# Patient Record
Sex: FEMALE | Race: WHITE | NOT HISPANIC OR LATINO | URBAN - METROPOLITAN AREA
[De-identification: names, ages, dates, MRNs, and addresses within clinical notes are randomized per-mention and may not be internally consistent; named-entity substitution may affect disease eponyms.]

---

## 2017-03-18 ENCOUNTER — INPATIENT (INPATIENT)
Facility: HOSPITAL | Age: 77
LOS: 5 days | Discharge: HOME HEALTH | DRG: 199 | End: 2017-03-24
Attending: INTERNAL MEDICINE | Admitting: INTERNAL MEDICINE
Payer: COMMERCIAL

## 2017-03-18 VITALS
HEIGHT: 64 IN | DIASTOLIC BLOOD PRESSURE: 78 MMHG | SYSTOLIC BLOOD PRESSURE: 102 MMHG | WEIGHT: 154.98 LBS | OXYGEN SATURATION: 94 % | RESPIRATION RATE: 16 BRPM | HEART RATE: 72 BPM

## 2017-03-18 DIAGNOSIS — J93.9 PNEUMOTHORAX, UNSPECIFIED: ICD-10-CM

## 2017-03-18 DIAGNOSIS — I10 ESSENTIAL (PRIMARY) HYPERTENSION: ICD-10-CM

## 2017-03-18 DIAGNOSIS — I63.9 CEREBRAL INFARCTION, UNSPECIFIED: ICD-10-CM

## 2017-03-18 DIAGNOSIS — E03.9 HYPOTHYROIDISM, UNSPECIFIED: ICD-10-CM

## 2017-03-18 DIAGNOSIS — J18.9 PNEUMONIA, UNSPECIFIED ORGANISM: ICD-10-CM

## 2017-03-18 DIAGNOSIS — Z29.9 ENCOUNTER FOR PROPHYLACTIC MEASURES, UNSPECIFIED: ICD-10-CM

## 2017-03-18 LAB
ALBUMIN SERPL ELPH-MCNC: 3.1 G/DL — LOW (ref 3.5–5)
ALP SERPL-CCNC: 71 U/L — SIGNIFICANT CHANGE UP (ref 40–120)
ALT FLD-CCNC: 20 U/L DA — SIGNIFICANT CHANGE UP (ref 10–60)
ANION GAP SERPL CALC-SCNC: 9 MMOL/L — SIGNIFICANT CHANGE UP (ref 5–17)
APPEARANCE UR: CLEAR — SIGNIFICANT CHANGE UP
APTT BLD: 27.3 SEC — LOW (ref 27.5–37.4)
AST SERPL-CCNC: 25 U/L — SIGNIFICANT CHANGE UP (ref 10–40)
BASE EXCESS BLDA CALC-SCNC: -3.1 MMOL/L — LOW (ref -2–2)
BASOPHILS # BLD AUTO: 0 K/UL — SIGNIFICANT CHANGE UP (ref 0–0.2)
BASOPHILS NFR BLD AUTO: 0.5 % — SIGNIFICANT CHANGE UP (ref 0–2)
BILIRUB SERPL-MCNC: 0.4 MG/DL — SIGNIFICANT CHANGE UP (ref 0.2–1.2)
BILIRUB UR-MCNC: NEGATIVE — SIGNIFICANT CHANGE UP
BLOOD GAS COMMENTS ARTERIAL: SIGNIFICANT CHANGE UP
BUN SERPL-MCNC: 61 MG/DL — HIGH (ref 7–18)
CALCIUM SERPL-MCNC: 8.5 MG/DL — SIGNIFICANT CHANGE UP (ref 8.4–10.5)
CHLORIDE SERPL-SCNC: 102 MMOL/L — SIGNIFICANT CHANGE UP (ref 96–108)
CO2 SERPL-SCNC: 24 MMOL/L — SIGNIFICANT CHANGE UP (ref 22–31)
COLOR SPEC: YELLOW — SIGNIFICANT CHANGE UP
CREAT SERPL-MCNC: 1.19 MG/DL — SIGNIFICANT CHANGE UP (ref 0.5–1.3)
DIFF PNL FLD: ABNORMAL
EOSINOPHIL # BLD AUTO: 0 K/UL — SIGNIFICANT CHANGE UP (ref 0–0.5)
EOSINOPHIL NFR BLD AUTO: 0.1 % — SIGNIFICANT CHANGE UP (ref 0–6)
FLUAV H1 2009 PAND RNA SPEC QL NAA+PROBE: DETECTED
GLUCOSE SERPL-MCNC: 119 MG/DL — HIGH (ref 70–99)
GLUCOSE UR QL: NEGATIVE — SIGNIFICANT CHANGE UP
HCO3 BLDA-SCNC: 20 MMOL/L — LOW (ref 23–27)
HCT VFR BLD CALC: 39 % — SIGNIFICANT CHANGE UP (ref 34.5–45)
HGB BLD-MCNC: 12.2 G/DL — SIGNIFICANT CHANGE UP (ref 11.5–15.5)
HOROWITZ INDEX BLDA+IHG-RTO: 28 — SIGNIFICANT CHANGE UP
INR BLD: 1.01 RATIO — SIGNIFICANT CHANGE UP (ref 0.88–1.16)
KETONES UR-MCNC: ABNORMAL
LACTATE SERPL-SCNC: 1.1 MMOL/L — SIGNIFICANT CHANGE UP (ref 0.7–2)
LEUKOCYTE ESTERASE UR-ACNC: ABNORMAL
LYMPHOCYTES # BLD AUTO: 0.3 K/UL — LOW (ref 1–3.3)
LYMPHOCYTES # BLD AUTO: 7.8 % — LOW (ref 13–44)
MCHC RBC-ENTMCNC: 26.2 PG — LOW (ref 27–34)
MCHC RBC-ENTMCNC: 31.3 GM/DL — LOW (ref 32–36)
MCV RBC AUTO: 83.9 FL — SIGNIFICANT CHANGE UP (ref 80–100)
MONOCYTES # BLD AUTO: 0.3 K/UL — SIGNIFICANT CHANGE UP (ref 0–0.9)
MONOCYTES NFR BLD AUTO: 7.6 % — SIGNIFICANT CHANGE UP (ref 2–14)
NEUTROPHILS # BLD AUTO: 3.7 K/UL — SIGNIFICANT CHANGE UP (ref 1.8–7.4)
NEUTROPHILS NFR BLD AUTO: 84 % — HIGH (ref 43–77)
NITRITE UR-MCNC: NEGATIVE — SIGNIFICANT CHANGE UP
PCO2 BLDA: 34 MMHG — SIGNIFICANT CHANGE UP (ref 32–46)
PH BLDA: 7.4 — SIGNIFICANT CHANGE UP (ref 7.35–7.45)
PH UR: 5 — SIGNIFICANT CHANGE UP (ref 4.8–8)
PLATELET # BLD AUTO: 183 K/UL — SIGNIFICANT CHANGE UP (ref 150–400)
PO2 BLDA: 59 MMHG — LOW (ref 74–108)
POTASSIUM SERPL-MCNC: 4.5 MMOL/L — SIGNIFICANT CHANGE UP (ref 3.5–5.3)
POTASSIUM SERPL-SCNC: 4.5 MMOL/L — SIGNIFICANT CHANGE UP (ref 3.5–5.3)
PROT SERPL-MCNC: 8.7 G/DL — HIGH (ref 6–8.3)
PROT UR-MCNC: 30 MG/DL
PROTHROM AB SERPL-ACNC: 11.3 SEC — SIGNIFICANT CHANGE UP (ref 10–13.1)
RAPID RVP RESULT: DETECTED
RBC # BLD: 4.65 M/UL — SIGNIFICANT CHANGE UP (ref 3.8–5.2)
RBC # FLD: 13.4 % — SIGNIFICANT CHANGE UP (ref 10.3–14.5)
SAO2 % BLDA: 88 % — LOW (ref 92–96)
SODIUM SERPL-SCNC: 135 MMOL/L — SIGNIFICANT CHANGE UP (ref 135–145)
SP GR SPEC: 1.01 — SIGNIFICANT CHANGE UP (ref 1.01–1.02)
UROBILINOGEN FLD QL: NEGATIVE — SIGNIFICANT CHANGE UP
WBC # BLD: 4.4 K/UL — SIGNIFICANT CHANGE UP (ref 3.8–10.5)
WBC # FLD AUTO: 4.4 K/UL — SIGNIFICANT CHANGE UP (ref 3.8–10.5)

## 2017-03-18 PROCEDURE — 99285 EMERGENCY DEPT VISIT HI MDM: CPT | Mod: 25

## 2017-03-18 PROCEDURE — 71010: CPT | Mod: 26

## 2017-03-18 RX ORDER — IPRATROPIUM/ALBUTEROL SULFATE 18-103MCG
3 AEROSOL WITH ADAPTER (GRAM) INHALATION ONCE
Qty: 0 | Refills: 0 | Status: COMPLETED | OUTPATIENT
Start: 2017-03-18 | End: 2017-03-18

## 2017-03-18 RX ORDER — IPRATROPIUM/ALBUTEROL SULFATE 18-103MCG
3 AEROSOL WITH ADAPTER (GRAM) INHALATION EVERY 6 HOURS
Qty: 0 | Refills: 0 | Status: DISCONTINUED | OUTPATIENT
Start: 2017-03-18 | End: 2017-03-24

## 2017-03-18 RX ORDER — ATORVASTATIN CALCIUM 80 MG/1
20 TABLET, FILM COATED ORAL AT BEDTIME
Qty: 0 | Refills: 0 | Status: DISCONTINUED | OUTPATIENT
Start: 2017-03-18 | End: 2017-03-18

## 2017-03-18 RX ORDER — CEFTRIAXONE 500 MG/1
1 INJECTION, POWDER, FOR SOLUTION INTRAMUSCULAR; INTRAVENOUS EVERY 24 HOURS
Qty: 0 | Refills: 0 | Status: DISCONTINUED | OUTPATIENT
Start: 2017-03-19 | End: 2017-03-24

## 2017-03-18 RX ORDER — LISINOPRIL 2.5 MG/1
5 TABLET ORAL DAILY
Qty: 0 | Refills: 0 | Status: DISCONTINUED | OUTPATIENT
Start: 2017-03-18 | End: 2017-03-24

## 2017-03-18 RX ORDER — AZITHROMYCIN 500 MG/1
500 TABLET, FILM COATED ORAL EVERY 24 HOURS
Qty: 0 | Refills: 0 | Status: DISCONTINUED | OUTPATIENT
Start: 2017-03-19 | End: 2017-03-24

## 2017-03-18 RX ORDER — CLOPIDOGREL BISULFATE 75 MG/1
75 TABLET, FILM COATED ORAL DAILY
Qty: 0 | Refills: 0 | Status: DISCONTINUED | OUTPATIENT
Start: 2017-03-18 | End: 2017-03-24

## 2017-03-18 RX ORDER — AMLODIPINE BESYLATE 2.5 MG/1
5 TABLET ORAL DAILY
Qty: 0 | Refills: 0 | Status: DISCONTINUED | OUTPATIENT
Start: 2017-03-18 | End: 2017-03-24

## 2017-03-18 RX ORDER — LEVOTHYROXINE SODIUM 125 MCG
75 TABLET ORAL DAILY
Qty: 0 | Refills: 0 | Status: DISCONTINUED | OUTPATIENT
Start: 2017-03-18 | End: 2017-03-24

## 2017-03-18 RX ORDER — MORPHINE SULFATE 50 MG/1
2 CAPSULE, EXTENDED RELEASE ORAL EVERY 4 HOURS
Qty: 0 | Refills: 0 | Status: DISCONTINUED | OUTPATIENT
Start: 2017-03-18 | End: 2017-03-24

## 2017-03-18 RX ORDER — ACETAMINOPHEN 500 MG
650 TABLET ORAL ONCE
Qty: 0 | Refills: 0 | Status: COMPLETED | OUTPATIENT
Start: 2017-03-18 | End: 2017-03-18

## 2017-03-18 RX ORDER — HEPARIN SODIUM 5000 [USP'U]/ML
5000 INJECTION INTRAVENOUS; SUBCUTANEOUS EVERY 8 HOURS
Qty: 0 | Refills: 0 | Status: DISCONTINUED | OUTPATIENT
Start: 2017-03-18 | End: 2017-03-24

## 2017-03-18 RX ORDER — AZITHROMYCIN 500 MG/1
500 TABLET, FILM COATED ORAL ONCE
Qty: 0 | Refills: 0 | Status: COMPLETED | OUTPATIENT
Start: 2017-03-18 | End: 2017-03-18

## 2017-03-18 RX ORDER — ACETAMINOPHEN 500 MG
650 TABLET ORAL EVERY 6 HOURS
Qty: 0 | Refills: 0 | Status: DISCONTINUED | OUTPATIENT
Start: 2017-03-18 | End: 2017-03-24

## 2017-03-18 RX ORDER — CEFTRIAXONE 500 MG/1
1 INJECTION, POWDER, FOR SOLUTION INTRAMUSCULAR; INTRAVENOUS ONCE
Qty: 0 | Refills: 0 | Status: COMPLETED | OUTPATIENT
Start: 2017-03-18 | End: 2017-03-18

## 2017-03-18 RX ADMIN — Medication 3 MILLILITER(S): at 12:23

## 2017-03-18 RX ADMIN — Medication 3 MILLILITER(S): at 12:33

## 2017-03-18 RX ADMIN — Medication 3 MILLILITER(S): at 12:03

## 2017-03-18 RX ADMIN — HEPARIN SODIUM 5000 UNIT(S): 5000 INJECTION INTRAVENOUS; SUBCUTANEOUS at 23:07

## 2017-03-18 RX ADMIN — Medication 650 MILLIGRAM(S): at 12:03

## 2017-03-18 RX ADMIN — AZITHROMYCIN 250 MILLIGRAM(S): 500 TABLET, FILM COATED ORAL at 14:20

## 2017-03-18 RX ADMIN — CEFTRIAXONE 100 GRAM(S): 500 INJECTION, POWDER, FOR SOLUTION INTRAMUSCULAR; INTRAVENOUS at 14:20

## 2017-03-18 NOTE — ED PROVIDER NOTE - NS ED MD SCRIBE ATTENDING SCRIBE SECTIONS
VITAL SIGNS( Pullset)/HIV/REVIEW OF SYSTEMS/PROGRESS NOTE/PHYSICAL EXAM/DISPOSITION/HISTORY OF PRESENT ILLNESS

## 2017-03-18 NOTE — ED PROVIDER NOTE - PROGRESS NOTE DETAILS
CXR showed large pneumothorax. Managed by surgery team with pigtail. Questionable pneumonia, will cover with Zithro and Ceftriaxone. Vitals stable. Patient feels better. Will admit to Dr Mcnulty. The scribe's documentation has been prepared under my direction and personally reviewed by me in its entirety. I confirm that the note above actually reflects all work, treatment, procedures, and medical decision-making performed by me - MINA Arellano

## 2017-03-18 NOTE — H&P ADULT. - ASSESSMENT
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    In ED, patient was tachypneic, tachycardic, with cxr s/o RT tension pneumothorax and ABG s/o hypoxic respiratory failure, 7.4/34/59/20/88%with 2 L NC. Surgery was consulted who placed RT sided chest tube.

## 2017-03-18 NOTE — H&P ADULT. - PROBLEM SELECTOR PLAN 1
Patient presented with fever, chills, night sweats, and worsening SOB.  On admission patient was tachycardic and tachypneic, with Patient presented with fever, chills, night sweats, and worsening SOB.  On admission patient was tachycardic and tachypneic, with spo2 94% RA.  patient had CXR s/o RT tension pneumothorax and ABG s/o hypoxic respiratory failure, 7.4/34/59/20/88%with 2 L NC.  Patient had Rt chest tube placed by surgery  s/p chest tube, repeat cxr was done s/o Weeks branch of lung following chest tube placement tiny right apical pneumothorax persists.  O/E- air entry decreased on Rt side with occasional crackles bilaterally  Rt side chest tube in place.  Will f/u with surgery for further recommendations.  Will repeat CXR daily to look for status of pneumothorax.  pain management with tylenol, percocet and morphine.  Oxygen inhalation prn  Will give Duoneb prn for sob or wheeze.

## 2017-03-18 NOTE — H&P ADULT. - ATTENDING COMMENTS
77 F (hx CVA 2013 - with residual partial L eye vision loss, hypothyroidism, HTN, ex-smoker 2ppd x 20 years, quit 20 years ago) present to ED for worsen SOB for past 2-3 days. States + cough, fever, night sweats for past 1-2 weeks. Notes visited  at nursing home 2 weeks ago. No CP, dizziness, palpitations, abdominal pain, N/V/D, dysuria, leg swelling. Cough at times severe, without SOB until last 2 days when felt progressive difficulty breathing.     In ED CXR with large RIGHT tension pneumothorax, right sided chest tube placed by surgery.     PE: vitals within normal limits on supplemental oxygen at time of admission  GEN: NAD, thin   HEENT: NCAT   CV: RRR   PULM: + R chest tube in place, + R lower lobe egophony and mild rales  ABD: soft, NTND   EXT: no edema    Admit for pneumothorax, CAP  - treat CAP (symptoms + RLL infiltrate on CXR) with IV Ceftriaxone, IV azithromycin, supplemental oxygen prn, check RVP  - patient without wheeze at this time, may start duonebs prn wheeze, SOB  - R chest tube placed, follow surgery reccs regarding follow up. Daily CXR, pain control  - resume home levothyroxine  - resume home BP medications, hold for SBP < 110  - continue plavix only for hx CVA. ASA and statin stopped by primary outpatient providers  - DVT ppx - HSQ  - FEN - DASH diet

## 2017-03-18 NOTE — H&P ADULT. - NEGATIVE MUSCULOSKELETAL SYMPTOMS
no arthralgia/no joint swelling/no arthritis/no muscle weakness/no myalgia/no muscle cramps/no neck pain/no stiffness

## 2017-03-18 NOTE — H&P ADULT. - PROBLEM SELECTOR PLAN 2
Patient had fever, night sweats and chills with cough along with RT basilar atelectasis vs infiltrate on CXR.  s/p 1 dose of rocephin and azithromycin in ED, will continue IV rocephin 1 gm OD and azithromycin 500mg iv OD.  f/u blood cultures.  Oxygen inhalation prn  Will give Duoneb prn for sob or wheeze. Patient had fever, night sweats and chills with cough along with RT basilar atelectasis vs infiltrate on CXR.  s/p 1 dose of rocephin and azithromycin in ED, will continue IV rocephin 1 gm OD and azithromycin 500mg iv OD.  f/u blood cultures.  Oxygen inhalation prn  Will give Duoneb prn for sob or wheeze.  Viral panel positive for influenza, will give tamiflu 75 mg BID for 5 days.  Pulmonology- Dr. Avila.

## 2017-03-18 NOTE — H&P ADULT. - NEGATIVE NEUROLOGICAL SYMPTOMS
no paresthesias/no focal seizures/no vertigo/no weakness/no transient paralysis/no syncope/no tremors/no generalized seizures

## 2017-03-18 NOTE — ED ADULT NURSE NOTE - ED STAT RN HANDOFF DETAILS 3
received   pt.in  bed  from ED at 2100 pt.is alert and  oriented x3. d/c  tele.underwater seal chest  tube intact to rt.chest wall.not   in  distress

## 2017-03-18 NOTE — H&P ADULT. - HISTORY OF PRESENT ILLNESS
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 Patient c/o intermittent fever, never recorded temperature, associated with chills and night sweats, associated with cough, unable to bring out the sputum. Associated with sob, which is worsening for last 2-3 days, initially on exertion and later progressed to at rest. Patient visited  2 weeks ago  at nursing home at [place] and later on she developed symptoms of runny nose and cough, of which runny nose resolved on its own. Patient also reports vomiting and diarrhea for a day, 3 days ago which subsided on its own. patient denies CP, dizziness, palpitations, abdominal pain, N/V/D, dysuria, leg swelling, urinary or bowel disturbances.  PSH- LT nipple removal for Breast cyst, tonsillectomy  FH- father has cardiac issues per patient.  Allergies- NKDA  Social Hx- ex smoker, 2ppd x 20 years, quit 20 years ago, non ethanolic, no illicit drug use.    In ED, patient was tachypneic, tachycardic, with cxr s/o RT tension pneumothorax and ABG s/o hypoxic respiratory failure, 7.4/34/59/20/88%with 2 L NC. Surgery was consulted who placed RT sided chest tube.

## 2017-03-18 NOTE — ED PROVIDER NOTE - PMH
CVA (cerebral vascular accident)  11/2/14  HTN (hypertension)  recently discontinued meds after diagnosis of carotid artery disease  Thyroid activity decreased    Tobacco abuse  quit 2000  UTI (urinary tract infection)  11/2/14 treated with antibiotics

## 2017-03-18 NOTE — H&P ADULT. - PROBLEM SELECTOR PLAN 5
Patient takes 75 microgm of synthroid at home, will continue home dose of synthroid and f/u TSH levels.

## 2017-03-18 NOTE — H&P ADULT. - MUSCULOSKELETAL
details… detailed exam normal strength/no joint swelling/no joint warmth/no calf tenderness/ROM intact/no joint erythema

## 2017-03-18 NOTE — H&P ADULT. - NEGATIVE OPHTHALMOLOGIC SYMPTOMS
no blurred vision L/no lacrimation R/no diplopia/no lacrimation L/no blurred vision R/no photophobia

## 2017-03-18 NOTE — H&P ADULT. - PROBLEM SELECTOR PLAN 4
Patient takes norvasc 5 mg once daily and lisinopril 5 mg once daily but unsure about dosage. Will continue with above dose with parameters  DASH diet.  Primary team to please reach out to pharmacy at Patient takes norvasc 5 mg once daily and lisinopril 5 mg once daily but unsure about dosage. Will continue with above dose with parameters  DASH diet.

## 2017-03-18 NOTE — ED PROVIDER NOTE - OBJECTIVE STATEMENT
76 y/o female with PMHx of CVA, Hypothyroidism, and Carotid occlusion (on Plavix and ASA) presents to the ED for SOB gradual onset with subjective fever and chills 6 days ago. Pt also notes associated with vomiting and diarrhea x 1 days. Pt notes that in the last 2 days he has noted progressively worsening SOB and SINGH. Pt also notes that he has been having cold like Sx when subjective fever started and was on pneumococcal vaccine. Pt denies CP, dizziness, palpations, LE pain and swelling, recent travel, recent sick contacts, rash, urinary Sx, abd pain, or any other complaints. NKDA. 78 y/o female with PMHx of CVA, Hypothyroidism, and Carotid occlusion (on Plavix and ASA) presents to the ED for SOB gradual onset with subjective fever and chills 6 days ago. Pt also notes associated with vomiting and diarrhea x 1 days (6 days ago). Pt notes that in the last 2 days he has noted progressively worsening SOB and SINGH. Pt also notes that he has been having cold like Sx when subjective fever started. Pt denies CP, dizziness, palpations, LE pain and swelling, recent travel, recent sick contacts, rash, urinary Sx, abd pain, or any other complaints. NKDA.

## 2017-03-18 NOTE — H&P ADULT. - PROBLEM SELECTOR PLAN 3
Patient takes plavix 75 mg once daily for CVA ( residual partial LT eye vision loss), will continue home dose of plavix.

## 2017-03-18 NOTE — ED PROVIDER NOTE - MEDICAL DECISION MAKING DETAILS
78 y/o female with worsened SOB and SINGH x 6 days with subjective fever and chills, no signs of distress. Hypoxic and tachypneic at 22-24. History and findings suggestive of PNA differential pleural effusion. 76 y/o female with worsened SOB and SINGH x 6 days with subjective fever and chills, no signs of distress. Hypoxic and tachypneic at 22-24. History and findings suggestive of PNA . Ddx: pneumothorax, pleural effusion. Plan: labs, urine, CXR, ECG and likely admission.

## 2017-03-19 LAB
ANION GAP SERPL CALC-SCNC: 6 MMOL/L — SIGNIFICANT CHANGE UP (ref 5–17)
BUN SERPL-MCNC: 37 MG/DL — HIGH (ref 7–18)
CALCIUM SERPL-MCNC: 8.8 MG/DL — SIGNIFICANT CHANGE UP (ref 8.4–10.5)
CHLORIDE SERPL-SCNC: 107 MMOL/L — SIGNIFICANT CHANGE UP (ref 96–108)
CHOLEST SERPL-MCNC: 136 MG/DL — SIGNIFICANT CHANGE UP (ref 10–199)
CO2 SERPL-SCNC: 28 MMOL/L — SIGNIFICANT CHANGE UP (ref 22–31)
CREAT SERPL-MCNC: 0.86 MG/DL — SIGNIFICANT CHANGE UP (ref 0.5–1.3)
CULTURE RESULTS: NO GROWTH — SIGNIFICANT CHANGE UP
FOLATE SERPL-MCNC: 19 NG/ML — SIGNIFICANT CHANGE UP (ref 4.8–24.2)
GLUCOSE SERPL-MCNC: 82 MG/DL — SIGNIFICANT CHANGE UP (ref 70–99)
HBA1C BLD-MCNC: 5.9 % — HIGH (ref 4–5.6)
HCT VFR BLD CALC: 39.7 % — SIGNIFICANT CHANGE UP (ref 34.5–45)
HDLC SERPL-MCNC: 28 MG/DL — LOW (ref 40–125)
HGB BLD-MCNC: 12.6 G/DL — SIGNIFICANT CHANGE UP (ref 11.5–15.5)
LIPID PNL WITH DIRECT LDL SERPL: 76 MG/DL — SIGNIFICANT CHANGE UP
MAGNESIUM SERPL-MCNC: 3 MG/DL — HIGH (ref 1.8–2.4)
MCHC RBC-ENTMCNC: 26.8 PG — LOW (ref 27–34)
MCHC RBC-ENTMCNC: 31.7 GM/DL — LOW (ref 32–36)
MCV RBC AUTO: 84.5 FL — SIGNIFICANT CHANGE UP (ref 80–100)
PHOSPHATE SERPL-MCNC: 2.3 MG/DL — LOW (ref 2.5–4.5)
PLATELET # BLD AUTO: 186 K/UL — SIGNIFICANT CHANGE UP (ref 150–400)
POTASSIUM SERPL-MCNC: 4.7 MMOL/L — SIGNIFICANT CHANGE UP (ref 3.5–5.3)
POTASSIUM SERPL-SCNC: 4.7 MMOL/L — SIGNIFICANT CHANGE UP (ref 3.5–5.3)
RBC # BLD: 4.7 M/UL — SIGNIFICANT CHANGE UP (ref 3.8–5.2)
RBC # FLD: 13.5 % — SIGNIFICANT CHANGE UP (ref 10.3–14.5)
SODIUM SERPL-SCNC: 141 MMOL/L — SIGNIFICANT CHANGE UP (ref 135–145)
SPECIMEN SOURCE: SIGNIFICANT CHANGE UP
TOTAL CHOLESTEROL/HDL RATIO MEASUREMENT: 4.9 RATIO — SIGNIFICANT CHANGE UP (ref 3.3–7.1)
TRIGL SERPL-MCNC: 160 MG/DL — HIGH (ref 10–149)
TSH SERPL-MCNC: 0.2 UU/ML — LOW (ref 0.34–4.82)
VIT B12 SERPL-MCNC: 1016 PG/ML — HIGH (ref 243–894)
WBC # BLD: 5.6 K/UL — SIGNIFICANT CHANGE UP (ref 3.8–10.5)
WBC # FLD AUTO: 5.6 K/UL — SIGNIFICANT CHANGE UP (ref 3.8–10.5)

## 2017-03-19 PROCEDURE — 71010: CPT | Mod: 26

## 2017-03-19 RX ORDER — SODIUM CHLORIDE 9 MG/ML
50 INJECTION INTRAMUSCULAR; INTRAVENOUS; SUBCUTANEOUS
Qty: 0 | Refills: 0 | Status: DISCONTINUED | OUTPATIENT
Start: 2017-03-19 | End: 2017-03-19

## 2017-03-19 RX ORDER — SODIUM CHLORIDE 9 MG/ML
500 INJECTION INTRAMUSCULAR; INTRAVENOUS; SUBCUTANEOUS
Qty: 0 | Refills: 0 | Status: COMPLETED | OUTPATIENT
Start: 2017-03-19 | End: 2017-03-19

## 2017-03-19 RX ADMIN — Medication 75 MILLIGRAM(S): at 06:30

## 2017-03-19 RX ADMIN — CEFTRIAXONE 100 GRAM(S): 500 INJECTION, POWDER, FOR SOLUTION INTRAMUSCULAR; INTRAVENOUS at 13:47

## 2017-03-19 RX ADMIN — CLOPIDOGREL BISULFATE 75 MILLIGRAM(S): 75 TABLET, FILM COATED ORAL at 13:47

## 2017-03-19 RX ADMIN — AZITHROMYCIN 250 MILLIGRAM(S): 500 TABLET, FILM COATED ORAL at 13:48

## 2017-03-19 RX ADMIN — SODIUM CHLORIDE 100 MILLILITER(S): 9 INJECTION INTRAMUSCULAR; INTRAVENOUS; SUBCUTANEOUS at 18:25

## 2017-03-19 RX ADMIN — HEPARIN SODIUM 5000 UNIT(S): 5000 INJECTION INTRAVENOUS; SUBCUTANEOUS at 22:13

## 2017-03-19 RX ADMIN — HEPARIN SODIUM 5000 UNIT(S): 5000 INJECTION INTRAVENOUS; SUBCUTANEOUS at 06:30

## 2017-03-19 RX ADMIN — Medication 75 MICROGRAM(S): at 06:30

## 2017-03-19 RX ADMIN — HEPARIN SODIUM 5000 UNIT(S): 5000 INJECTION INTRAVENOUS; SUBCUTANEOUS at 13:47

## 2017-03-19 RX ADMIN — Medication 75 MILLIGRAM(S): at 18:25

## 2017-03-19 NOTE — PATIENT PROFILE ADULT. - VISION (WITH CORRECTIVE LENSES IF THE PATIENT USUALLY WEARS THEM):
left  eye vision impaired as chief complain on admission/Partially impaired: cannot see medication labels or newsprint, but can see obstacles in path, and the surrounding layout; can count fingers at arm's length

## 2017-03-20 LAB
24R-OH-CALCIDIOL SERPL-MCNC: 13.6 NG/ML — LOW (ref 30–100)
ANION GAP SERPL CALC-SCNC: 6 MMOL/L — SIGNIFICANT CHANGE UP (ref 5–17)
BASOPHILS # BLD AUTO: 0 K/UL — SIGNIFICANT CHANGE UP (ref 0–0.2)
BASOPHILS NFR BLD AUTO: 0.8 % — SIGNIFICANT CHANGE UP (ref 0–2)
BUN SERPL-MCNC: 14 MG/DL — SIGNIFICANT CHANGE UP (ref 7–18)
CALCIUM SERPL-MCNC: 8.5 MG/DL — SIGNIFICANT CHANGE UP (ref 8.4–10.5)
CHLORIDE SERPL-SCNC: 106 MMOL/L — SIGNIFICANT CHANGE UP (ref 96–108)
CO2 SERPL-SCNC: 28 MMOL/L — SIGNIFICANT CHANGE UP (ref 22–31)
CREAT SERPL-MCNC: 0.64 MG/DL — SIGNIFICANT CHANGE UP (ref 0.5–1.3)
EOSINOPHIL # BLD AUTO: 0 K/UL — SIGNIFICANT CHANGE UP (ref 0–0.5)
EOSINOPHIL NFR BLD AUTO: 0.2 % — SIGNIFICANT CHANGE UP (ref 0–6)
GLUCOSE SERPL-MCNC: 90 MG/DL — SIGNIFICANT CHANGE UP (ref 70–99)
HCT VFR BLD CALC: 39.9 % — SIGNIFICANT CHANGE UP (ref 34.5–45)
HGB BLD-MCNC: 12.7 G/DL — SIGNIFICANT CHANGE UP (ref 11.5–15.5)
LYMPHOCYTES # BLD AUTO: 0.7 K/UL — LOW (ref 1–3.3)
LYMPHOCYTES # BLD AUTO: 13.2 % — SIGNIFICANT CHANGE UP (ref 13–44)
MAGNESIUM SERPL-MCNC: 2.4 MG/DL — SIGNIFICANT CHANGE UP (ref 1.8–2.4)
MCHC RBC-ENTMCNC: 26.6 PG — LOW (ref 27–34)
MCHC RBC-ENTMCNC: 31.8 GM/DL — LOW (ref 32–36)
MCV RBC AUTO: 83.6 FL — SIGNIFICANT CHANGE UP (ref 80–100)
MONOCYTES # BLD AUTO: 0.6 K/UL — SIGNIFICANT CHANGE UP (ref 0–0.9)
MONOCYTES NFR BLD AUTO: 12.5 % — SIGNIFICANT CHANGE UP (ref 2–14)
NEUTROPHILS # BLD AUTO: 3.8 K/UL — SIGNIFICANT CHANGE UP (ref 1.8–7.4)
NEUTROPHILS NFR BLD AUTO: 73.3 % — SIGNIFICANT CHANGE UP (ref 43–77)
PHOSPHATE SERPL-MCNC: 1.8 MG/DL — LOW (ref 2.5–4.5)
PLATELET # BLD AUTO: 213 K/UL — SIGNIFICANT CHANGE UP (ref 150–400)
POTASSIUM SERPL-MCNC: 4.1 MMOL/L — SIGNIFICANT CHANGE UP (ref 3.5–5.3)
POTASSIUM SERPL-SCNC: 4.1 MMOL/L — SIGNIFICANT CHANGE UP (ref 3.5–5.3)
RBC # BLD: 4.78 M/UL — SIGNIFICANT CHANGE UP (ref 3.8–5.2)
RBC # FLD: 13.3 % — SIGNIFICANT CHANGE UP (ref 10.3–14.5)
SODIUM SERPL-SCNC: 140 MMOL/L — SIGNIFICANT CHANGE UP (ref 135–145)
WBC # BLD: 5.2 K/UL — SIGNIFICANT CHANGE UP (ref 3.8–10.5)
WBC # FLD AUTO: 5.2 K/UL — SIGNIFICANT CHANGE UP (ref 3.8–10.5)

## 2017-03-20 PROCEDURE — 71010: CPT | Mod: 26

## 2017-03-20 PROCEDURE — 71010: CPT | Mod: 26,77

## 2017-03-20 RX ORDER — POTASSIUM CHLORIDE 20 MEQ
10 PACKET (EA) ORAL ONCE
Qty: 0 | Refills: 0 | Status: COMPLETED | OUTPATIENT
Start: 2017-03-20 | End: 2017-03-20

## 2017-03-20 RX ORDER — SODIUM,POTASSIUM PHOSPHATES 278-250MG
1 POWDER IN PACKET (EA) ORAL
Qty: 0 | Refills: 0 | Status: COMPLETED | OUTPATIENT
Start: 2017-03-20 | End: 2017-03-23

## 2017-03-20 RX ADMIN — Medication 100 MILLIEQUIVALENT(S): at 22:25

## 2017-03-20 RX ADMIN — Medication 1 TABLET(S): at 22:56

## 2017-03-20 RX ADMIN — HEPARIN SODIUM 5000 UNIT(S): 5000 INJECTION INTRAVENOUS; SUBCUTANEOUS at 22:25

## 2017-03-20 RX ADMIN — CEFTRIAXONE 100 GRAM(S): 500 INJECTION, POWDER, FOR SOLUTION INTRAMUSCULAR; INTRAVENOUS at 12:04

## 2017-03-20 RX ADMIN — HEPARIN SODIUM 5000 UNIT(S): 5000 INJECTION INTRAVENOUS; SUBCUTANEOUS at 05:04

## 2017-03-20 RX ADMIN — CLOPIDOGREL BISULFATE 75 MILLIGRAM(S): 75 TABLET, FILM COATED ORAL at 12:02

## 2017-03-20 RX ADMIN — HEPARIN SODIUM 5000 UNIT(S): 5000 INJECTION INTRAVENOUS; SUBCUTANEOUS at 14:00

## 2017-03-20 RX ADMIN — Medication 75 MICROGRAM(S): at 05:05

## 2017-03-20 RX ADMIN — Medication 75 MILLIGRAM(S): at 05:05

## 2017-03-20 RX ADMIN — LISINOPRIL 5 MILLIGRAM(S): 2.5 TABLET ORAL at 05:05

## 2017-03-20 RX ADMIN — AZITHROMYCIN 250 MILLIGRAM(S): 500 TABLET, FILM COATED ORAL at 12:04

## 2017-03-20 RX ADMIN — AMLODIPINE BESYLATE 5 MILLIGRAM(S): 2.5 TABLET ORAL at 05:07

## 2017-03-20 RX ADMIN — Medication 75 MILLIGRAM(S): at 18:03

## 2017-03-20 NOTE — PHYSICAL THERAPY INITIAL EVALUATION ADULT - ACTIVE RANGE OF MOTION EXAMINATION, REHAB EVAL
bilateral upper extremity Active ROM was WFL (within functional limits)/grossly assessed due to Right chest tube  AROM/bilateral  lower extremity Active ROM was WFL (within functional limits)

## 2017-03-20 NOTE — PHYSICAL THERAPY INITIAL EVALUATION ADULT - GENERAL OBSERVATIONS, REHAB EVAL
Consult received, chart reviewed. Patient received supine in bed, NAD, Right chest tube , on droplet precaution .  Patient agreed to EVALUATION from Physical Therapist.

## 2017-03-20 NOTE — ED ADULT NURSE REASSESSMENT NOTE - NS ED NURSE REASSESS COMMENT FT1
pt resting comfortable not in any distress on droplet prec.
pt. sleeping well no complaints voiced. calls for assist as instructed.
received pt aox3 calls for assistance with toileting. on droplet prec
Patient received lying in bed being nursed in semi folwers position, alert and oriented x3, breathing spontaneously on room air. Underwater seal chest tube in place in right chest wall, same oscillating well. Respiratory distress noted. Patient voiced no complaints of pain or discomfort at present. Patient is being admitted, awaiting bed availability.

## 2017-03-21 PROCEDURE — 71010: CPT | Mod: 26

## 2017-03-21 RX ORDER — ERGOCALCIFEROL 1.25 MG/1
50000 CAPSULE ORAL
Qty: 0 | Refills: 0 | Status: DISCONTINUED | OUTPATIENT
Start: 2017-03-21 | End: 2017-03-24

## 2017-03-21 RX ADMIN — HEPARIN SODIUM 5000 UNIT(S): 5000 INJECTION INTRAVENOUS; SUBCUTANEOUS at 14:46

## 2017-03-21 RX ADMIN — Medication 1 TABLET(S): at 12:25

## 2017-03-21 RX ADMIN — HEPARIN SODIUM 5000 UNIT(S): 5000 INJECTION INTRAVENOUS; SUBCUTANEOUS at 06:01

## 2017-03-21 RX ADMIN — CEFTRIAXONE 100 GRAM(S): 500 INJECTION, POWDER, FOR SOLUTION INTRAMUSCULAR; INTRAVENOUS at 13:35

## 2017-03-21 RX ADMIN — Medication 1 TABLET(S): at 01:42

## 2017-03-21 RX ADMIN — Medication 100 MILLIGRAM(S): at 12:23

## 2017-03-21 RX ADMIN — Medication 1 TABLET(S): at 09:47

## 2017-03-21 RX ADMIN — AZITHROMYCIN 250 MILLIGRAM(S): 500 TABLET, FILM COATED ORAL at 13:35

## 2017-03-21 RX ADMIN — CLOPIDOGREL BISULFATE 75 MILLIGRAM(S): 75 TABLET, FILM COATED ORAL at 12:23

## 2017-03-21 RX ADMIN — Medication 1 TABLET(S): at 17:37

## 2017-03-21 RX ADMIN — Medication 75 MILLIGRAM(S): at 17:37

## 2017-03-21 RX ADMIN — Medication 75 MICROGRAM(S): at 06:02

## 2017-03-21 RX ADMIN — HEPARIN SODIUM 5000 UNIT(S): 5000 INJECTION INTRAVENOUS; SUBCUTANEOUS at 22:37

## 2017-03-21 RX ADMIN — Medication 100 MILLIGRAM(S): at 17:37

## 2017-03-21 RX ADMIN — Medication 1 TABLET(S): at 22:37

## 2017-03-21 RX ADMIN — ERGOCALCIFEROL 50000 UNIT(S): 1.25 CAPSULE ORAL at 13:35

## 2017-03-21 RX ADMIN — LISINOPRIL 5 MILLIGRAM(S): 2.5 TABLET ORAL at 06:01

## 2017-03-21 RX ADMIN — Medication 75 MILLIGRAM(S): at 06:01

## 2017-03-21 RX ADMIN — AMLODIPINE BESYLATE 5 MILLIGRAM(S): 2.5 TABLET ORAL at 06:01

## 2017-03-21 NOTE — DIETITIAN INITIAL EVALUATION ADULT. - PROBLEM SELECTOR PLAN 2
Patient had fever, night sweats and chills with cough along with RT basilar atelectasis vs infiltrate on CXR.  s/p 1 dose of rocephin and azithromycin in ED, will continue IV rocephin 1 gm OD and azithromycin 500mg iv OD.  f/u blood cultures.  Oxygen inhalation prn  Will give Duoneb prn for sob or wheeze.  Viral panel positive for influenza, will give tamiflu 75 mg BID for 5 days.  Pulmonology- Dr. Avila.

## 2017-03-21 NOTE — DIETITIAN INITIAL EVALUATION ADULT. - PROBLEM SELECTOR PLAN 1
Patient presented with fever, chills, night sweats, and worsening SOB.  On admission patient was tachycardic and tachypneic, with spo2 94% RA.  patient had CXR s/o RT tension pneumothorax and ABG s/o hypoxic respiratory failure, 7.4/34/59/20/88%with 2 L NC.  Patient had Rt chest tube placed by surgery  s/p chest tube, repeat cxr was done s/o Weeks branch of lung following chest tube placement tiny right apical pneumothorax persists.  O/E- air entry decreased on Rt side with occasional crackles bilaterally  Rt side chest tube in place.  Will f/u with surgery for further recommendations.  Will repeat CXR daily to look for status of pneumothorax.  pain management with tylenol, percocet and morphine.  Oxygen inhalation prn  Will give Duoneb prn for sob or wheeze.

## 2017-03-21 NOTE — DIETITIAN INITIAL EVALUATION ADULT. - PROBLEM SELECTOR PLAN 4
Patient takes norvasc 5 mg once daily and lisinopril 5 mg once daily but unsure about dosage. Will continue with above dose with parameters  DASH diet.

## 2017-03-22 RX ADMIN — Medication 75 MICROGRAM(S): at 06:28

## 2017-03-22 RX ADMIN — Medication 75 MILLIGRAM(S): at 06:28

## 2017-03-22 RX ADMIN — CLOPIDOGREL BISULFATE 75 MILLIGRAM(S): 75 TABLET, FILM COATED ORAL at 15:11

## 2017-03-22 RX ADMIN — HEPARIN SODIUM 5000 UNIT(S): 5000 INJECTION INTRAVENOUS; SUBCUTANEOUS at 14:10

## 2017-03-22 RX ADMIN — Medication 100 MILLIGRAM(S): at 06:28

## 2017-03-22 RX ADMIN — AZITHROMYCIN 250 MILLIGRAM(S): 500 TABLET, FILM COATED ORAL at 14:09

## 2017-03-22 RX ADMIN — Medication 100 MILLIGRAM(S): at 23:27

## 2017-03-22 RX ADMIN — HEPARIN SODIUM 5000 UNIT(S): 5000 INJECTION INTRAVENOUS; SUBCUTANEOUS at 21:20

## 2017-03-22 RX ADMIN — Medication 100 MILLIGRAM(S): at 19:00

## 2017-03-22 RX ADMIN — Medication 75 MILLIGRAM(S): at 19:00

## 2017-03-22 RX ADMIN — Medication 1 TABLET(S): at 21:20

## 2017-03-22 RX ADMIN — Medication 1 TABLET(S): at 09:04

## 2017-03-22 RX ADMIN — HEPARIN SODIUM 5000 UNIT(S): 5000 INJECTION INTRAVENOUS; SUBCUTANEOUS at 06:27

## 2017-03-22 RX ADMIN — Medication 1 TABLET(S): at 12:21

## 2017-03-22 RX ADMIN — Medication 100 MILLIGRAM(S): at 12:21

## 2017-03-22 RX ADMIN — CEFTRIAXONE 100 GRAM(S): 500 INJECTION, POWDER, FOR SOLUTION INTRAMUSCULAR; INTRAVENOUS at 14:12

## 2017-03-22 RX ADMIN — LISINOPRIL 5 MILLIGRAM(S): 2.5 TABLET ORAL at 06:28

## 2017-03-22 RX ADMIN — Medication 1 TABLET(S): at 19:00

## 2017-03-22 RX ADMIN — AMLODIPINE BESYLATE 5 MILLIGRAM(S): 2.5 TABLET ORAL at 06:28

## 2017-03-23 LAB
CULTURE RESULTS: SIGNIFICANT CHANGE UP
CULTURE RESULTS: SIGNIFICANT CHANGE UP
RAPID RVP RESULT: SIGNIFICANT CHANGE UP
SPECIMEN SOURCE: SIGNIFICANT CHANGE UP
SPECIMEN SOURCE: SIGNIFICANT CHANGE UP

## 2017-03-23 RX ADMIN — Medication 100 MILLIGRAM(S): at 12:50

## 2017-03-23 RX ADMIN — Medication 100 MILLIGRAM(S): at 05:43

## 2017-03-23 RX ADMIN — AMLODIPINE BESYLATE 5 MILLIGRAM(S): 2.5 TABLET ORAL at 05:44

## 2017-03-23 RX ADMIN — HEPARIN SODIUM 5000 UNIT(S): 5000 INJECTION INTRAVENOUS; SUBCUTANEOUS at 05:43

## 2017-03-23 RX ADMIN — Medication 1 TABLET(S): at 17:29

## 2017-03-23 RX ADMIN — Medication 1 TABLET(S): at 12:50

## 2017-03-23 RX ADMIN — Medication 100 MILLIGRAM(S): at 17:29

## 2017-03-23 RX ADMIN — HEPARIN SODIUM 5000 UNIT(S): 5000 INJECTION INTRAVENOUS; SUBCUTANEOUS at 13:01

## 2017-03-23 RX ADMIN — AZITHROMYCIN 250 MILLIGRAM(S): 500 TABLET, FILM COATED ORAL at 12:50

## 2017-03-23 RX ADMIN — CEFTRIAXONE 100 GRAM(S): 500 INJECTION, POWDER, FOR SOLUTION INTRAMUSCULAR; INTRAVENOUS at 12:50

## 2017-03-23 RX ADMIN — LISINOPRIL 5 MILLIGRAM(S): 2.5 TABLET ORAL at 05:43

## 2017-03-23 RX ADMIN — Medication 75 MILLIGRAM(S): at 17:29

## 2017-03-23 RX ADMIN — Medication 1 TABLET(S): at 08:33

## 2017-03-23 RX ADMIN — CLOPIDOGREL BISULFATE 75 MILLIGRAM(S): 75 TABLET, FILM COATED ORAL at 12:50

## 2017-03-23 RX ADMIN — HEPARIN SODIUM 5000 UNIT(S): 5000 INJECTION INTRAVENOUS; SUBCUTANEOUS at 22:17

## 2017-03-23 RX ADMIN — Medication 75 MICROGRAM(S): at 05:43

## 2017-03-23 RX ADMIN — Medication 75 MILLIGRAM(S): at 05:44

## 2017-03-24 ENCOUNTER — TRANSCRIPTION ENCOUNTER (OUTPATIENT)
Age: 77
End: 2017-03-24

## 2017-03-24 VITALS
SYSTOLIC BLOOD PRESSURE: 113 MMHG | RESPIRATION RATE: 14 BRPM | TEMPERATURE: 98 F | DIASTOLIC BLOOD PRESSURE: 68 MMHG | OXYGEN SATURATION: 100 % | HEART RATE: 53 BPM

## 2017-03-24 LAB
BASOPHILS # BLD AUTO: 0 K/UL — SIGNIFICANT CHANGE UP (ref 0–0.2)
BASOPHILS NFR BLD AUTO: 0.6 % — SIGNIFICANT CHANGE UP (ref 0–2)
EOSINOPHIL # BLD AUTO: 0.1 K/UL — SIGNIFICANT CHANGE UP (ref 0–0.5)
EOSINOPHIL NFR BLD AUTO: 2.2 % — SIGNIFICANT CHANGE UP (ref 0–6)
HCT VFR BLD CALC: 34.8 % — SIGNIFICANT CHANGE UP (ref 34.5–45)
HGB BLD-MCNC: 11.1 G/DL — LOW (ref 11.5–15.5)
LYMPHOCYTES # BLD AUTO: 1.2 K/UL — SIGNIFICANT CHANGE UP (ref 1–3.3)
LYMPHOCYTES # BLD AUTO: 17.8 % — SIGNIFICANT CHANGE UP (ref 13–44)
MCHC RBC-ENTMCNC: 26.8 PG — LOW (ref 27–34)
MCHC RBC-ENTMCNC: 31.8 GM/DL — LOW (ref 32–36)
MCV RBC AUTO: 84.1 FL — SIGNIFICANT CHANGE UP (ref 80–100)
MONOCYTES # BLD AUTO: 0.8 K/UL — SIGNIFICANT CHANGE UP (ref 0–0.9)
MONOCYTES NFR BLD AUTO: 12.1 % — SIGNIFICANT CHANGE UP (ref 2–14)
NEUTROPHILS # BLD AUTO: 4.4 K/UL — SIGNIFICANT CHANGE UP (ref 1.8–7.4)
NEUTROPHILS NFR BLD AUTO: 67.4 % — SIGNIFICANT CHANGE UP (ref 43–77)
PLATELET # BLD AUTO: 285 K/UL — SIGNIFICANT CHANGE UP (ref 150–400)
RBC # BLD: 4.13 M/UL — SIGNIFICANT CHANGE UP (ref 3.8–5.2)
RBC # FLD: 13.6 % — SIGNIFICANT CHANGE UP (ref 10.3–14.5)
WBC # BLD: 6.5 K/UL — SIGNIFICANT CHANGE UP (ref 3.8–10.5)
WBC # FLD AUTO: 6.5 K/UL — SIGNIFICANT CHANGE UP (ref 3.8–10.5)

## 2017-03-24 RX ORDER — ERGOCALCIFEROL 1.25 MG/1
1 CAPSULE ORAL
Qty: 4 | Refills: 0 | OUTPATIENT
Start: 2017-03-24 | End: 2017-04-23

## 2017-03-24 RX ORDER — ACETAMINOPHEN 500 MG
2 TABLET ORAL
Qty: 0 | Refills: 0 | COMMUNITY
Start: 2017-03-24

## 2017-03-24 RX ADMIN — HEPARIN SODIUM 5000 UNIT(S): 5000 INJECTION INTRAVENOUS; SUBCUTANEOUS at 06:01

## 2017-03-24 RX ADMIN — Medication 100 MILLIGRAM(S): at 06:01

## 2017-03-24 RX ADMIN — LISINOPRIL 5 MILLIGRAM(S): 2.5 TABLET ORAL at 06:01

## 2017-03-24 RX ADMIN — Medication 100 MILLIGRAM(S): at 00:58

## 2017-03-24 RX ADMIN — Medication 100 MILLIGRAM(S): at 12:13

## 2017-03-24 RX ADMIN — Medication 75 MICROGRAM(S): at 06:01

## 2017-03-24 RX ADMIN — AMLODIPINE BESYLATE 5 MILLIGRAM(S): 2.5 TABLET ORAL at 06:01

## 2017-03-24 RX ADMIN — CLOPIDOGREL BISULFATE 75 MILLIGRAM(S): 75 TABLET, FILM COATED ORAL at 12:13

## 2017-03-24 NOTE — DISCHARGE NOTE ADULT - NSTOBACCOSMKCESSPRO_GEN_A_CS
Referred to [facility] for Tobacco Control.../[facility] Smoking Cessation Program...

## 2017-03-24 NOTE — DISCHARGE NOTE ADULT - CARE PLAN
Principal Discharge DX:	Pneumothorax on right  Goal:	resolved  Instructions for follow-up, activity and diet:	Followup with PCP in five days  Secondary Diagnosis:	Pneumonia  Goal:	resolution  Instructions for follow-up, activity and diet:	Followup with PCP in five days  Secondary Diagnosis:	HTN (hypertension)  Goal:	<140/90  Instructions for follow-up, activity and diet:	Followup with PCP in five days.

## 2017-03-24 NOTE — DISCHARGE NOTE ADULT - PATIENT PORTAL LINK FT
“You can access the [facility] Patient Portal, offered by [facility], by registering with the following website: [email or web address]”

## 2017-03-24 NOTE — DISCHARGE NOTE ADULT - CARE PROVIDER_API CALL
Yesica Allen), Internal Medicine  [address] [place] [address]  Phone: [phone]  Fax: [phone]    Tessy Jaffe  Phone: (   )    -  Fax: (   )    -

## 2017-03-24 NOTE — DISCHARGE NOTE ADULT - MEDICATION SUMMARY - MEDICATIONS TO TAKE
I will START or STAY ON the medications listed below when I get home from the hospital:    acetaminophen 325 mg oral tablet  -- 2 tab(s) by mouth every 6 hours, As needed, Mild Pain (1 - 3)  -- Indication: For Pain     lisinopril 5 mg oral tablet  -- 1 tab(s) by mouth once a day  -- Indication: For HTN (hypertension)    clopidogrel 75 mg oral tablet  -- 1 tab(s) by mouth once a day  -- Indication: For CAD    Norvasc 5 mg oral tablet  -- 1 tab(s) by mouth once a day  -- Indication: For HTN (hypertension)    guaiFENesin 100 mg/5 mL oral liquid  -- 5 milliliter(s) by mouth every 6 hours  -- Indication: For Robitussin    levothyroxine 75 mcg (0.075 mg) oral tablet  -- 1 tab(s) by mouth once a day  -- Indication: For Hypothyroid    ergocalciferol 50,000 intl units oral capsule  -- 1 cap(s) by mouth once a week  -- Indication: For supplement

## 2017-03-24 NOTE — DISCHARGE NOTE ADULT - HOSPITAL COURSE
77 F (hx CVA 2013 - with residual partial L eye vision loss, hypothyroidism, HTN, ex-smoker 2ppd x 20 years, quit 20 years ago) present to ED for worsening SOB, cough, fever for 2-3 days.     In ED CXR with large RIGHT tension pneumothorax, right sided chest tube placed by surgery.       Admitted for pneumothorax, CAP (RLL infiltrate on CXR) treated with  IV Ceftriaxone, IV azithromycin, supplemental oxygen prn,   RVP +Influenza A   Dr. Avila, pulmonary, consulted  R chest tube placed,  surgery followed   Repeat CXR: Right chest tube removed. No gross right pneumothorax.  Mild increased interstitial lung markings without significant change.   Airspace opacities lung bases without significant change. No pleural   effusion    Home meds resumed home  Physical theraphy recommeded home care with home PT  Pt completed last dose of ABT's today.  Dr. Allen, attending, discharged pt home with followup with PCP in five days. 77 F (hx CVA 2013 - with residual partial L eye vision loss, hypothyroidism, HTN, ex-smoker 2ppd x 20 years, quit 20 years ago) present to ED for worsening SOB, cough, fever for 2-3 days.     In ED CXR with large RIGHT tension pneumothorax, right sided chest tube placed by surgery.       Admitted for pneumothorax, CAP (RLL infiltrate on CXR) treated with  IV Ceftriaxone, IV azithromycin, supplemental oxygen prn,   RVP +Influenza A   Dr. Avila, pulmonary, consulted  R chest tube placed,  surgery followed   Repeat CXR: Right chest tube removed. No gross right pneumothorax.  Mild increased interstitial lung markings without significant change.   Airspace opacities lung bases without significant change. No pleural   effusion    Home meds resumed home  Physical theraphy recommeded home care with home PT  Patient completed Tamiflu and antibiotics course.  Patient is medically stable for discharged home as per attending Dr. Allen, with followup with PCP in five days.

## 2017-03-28 DIAGNOSIS — I69.398 OTHER SEQUELAE OF CEREBRAL INFARCTION: ICD-10-CM

## 2017-03-28 DIAGNOSIS — E03.9 HYPOTHYROIDISM, UNSPECIFIED: ICD-10-CM

## 2017-03-28 DIAGNOSIS — J11.00 INFLUENZA DUE TO UNIDENTIFIED INFLUENZA VIRUS WITH UNSPECIFIED TYPE OF PNEUMONIA: ICD-10-CM

## 2017-03-28 DIAGNOSIS — I10 ESSENTIAL (PRIMARY) HYPERTENSION: ICD-10-CM

## 2017-03-28 DIAGNOSIS — E55.9 VITAMIN D DEFICIENCY, UNSPECIFIED: ICD-10-CM

## 2017-03-28 DIAGNOSIS — Z28.21 IMMUNIZATION NOT CARRIED OUT BECAUSE OF PATIENT REFUSAL: ICD-10-CM

## 2017-03-28 DIAGNOSIS — I25.10 ATHEROSCLEROTIC HEART DISEASE OF NATIVE CORONARY ARTERY WITHOUT ANGINA PECTORIS: ICD-10-CM

## 2017-03-28 DIAGNOSIS — E83.39 OTHER DISORDERS OF PHOSPHORUS METABOLISM: ICD-10-CM

## 2017-03-28 DIAGNOSIS — Z87.891 PERSONAL HISTORY OF NICOTINE DEPENDENCE: ICD-10-CM

## 2017-03-28 DIAGNOSIS — Z79.82 LONG TERM (CURRENT) USE OF ASPIRIN: ICD-10-CM

## 2017-03-28 DIAGNOSIS — J96.91 RESPIRATORY FAILURE, UNSPECIFIED WITH HYPOXIA: ICD-10-CM

## 2017-03-28 DIAGNOSIS — J93.9 PNEUMOTHORAX, UNSPECIFIED: ICD-10-CM

## 2017-03-29 DIAGNOSIS — I69.898 OTHER SEQUELAE OF OTHER CEREBROVASCULAR DISEASE: ICD-10-CM

## 2017-03-29 DIAGNOSIS — J93.0 SPONTANEOUS TENSION PNEUMOTHORAX: ICD-10-CM

## 2017-07-07 RX ORDER — AMLODIPINE BESYLATE 2.5 MG/1
1 TABLET ORAL
Qty: 0 | Refills: 0 | COMMUNITY

## 2017-07-07 RX ORDER — LISINOPRIL 2.5 MG/1
1 TABLET ORAL
Qty: 0 | Refills: 0 | COMMUNITY

## 2017-07-10 ENCOUNTER — APPOINTMENT (OUTPATIENT)
Dept: CT IMAGING | Facility: HOSPITAL | Age: 77
End: 2017-07-10

## 2017-07-10 ENCOUNTER — OUTPATIENT (OUTPATIENT)
Dept: OUTPATIENT SERVICES | Facility: HOSPITAL | Age: 77
LOS: 1 days | End: 2017-07-10
Payer: COMMERCIAL

## 2017-07-10 DIAGNOSIS — Z87.891 PERSONAL HISTORY OF NICOTINE DEPENDENCE: ICD-10-CM

## 2017-07-10 DIAGNOSIS — R93.8 ABNORMAL FINDINGS ON DIAGNOSTIC IMAGING OF OTHER SPECIFIED BODY STRUCTURES: ICD-10-CM

## 2017-07-10 PROCEDURE — 71250 CT THORAX DX C-: CPT

## 2019-01-16 ENCOUNTER — APPOINTMENT (OUTPATIENT)
Dept: CT IMAGING | Facility: HOSPITAL | Age: 79
End: 2019-01-16
Payer: COMMERCIAL

## 2019-01-16 ENCOUNTER — APPOINTMENT (OUTPATIENT)
Dept: ULTRASOUND IMAGING | Facility: HOSPITAL | Age: 79
End: 2019-01-16
Payer: COMMERCIAL

## 2019-01-16 ENCOUNTER — OUTPATIENT (OUTPATIENT)
Dept: OUTPATIENT SERVICES | Facility: HOSPITAL | Age: 79
LOS: 1 days | End: 2019-01-16
Payer: COMMERCIAL

## 2019-01-16 DIAGNOSIS — R91.8 OTHER NONSPECIFIC ABNORMAL FINDING OF LUNG FIELD: ICD-10-CM

## 2019-01-16 PROCEDURE — 71250 CT THORAX DX C-: CPT

## 2019-01-16 PROCEDURE — 93880 EXTRACRANIAL BILAT STUDY: CPT | Mod: 26

## 2019-01-16 PROCEDURE — 71250 CT THORAX DX C-: CPT | Mod: 26

## 2019-01-16 PROCEDURE — 93880 EXTRACRANIAL BILAT STUDY: CPT

## 2022-09-30 NOTE — ED PROVIDER NOTE - CONDUCTED A DETAILED DISCUSSION WITH PATIENT AND/OR GUARDIAN REGARDING, MDM
The patient is Stable - Low risk of patient condition declining or worsening    Shift Goals  Clinical Goals: Possible Cath intervention  Patient Goals: eat    Progress made toward(s) clinical / shift goals:    Problem: Hemodynamics  Goal: Patient's hemodynamics, fluid balance and neurologic status will be stable or improve  Outcome: Progressing       Patient is not progressing towards the following goals:      Problem: Communication  Goal: The ability to communicate needs accurately and effectively will improve  Outcome: Not Progressing      need for outpatient follow-up/return to ED if symptoms worsen, persist or questions arise